# Patient Record
Sex: FEMALE | NOT HISPANIC OR LATINO | URBAN - METROPOLITAN AREA
[De-identification: names, ages, dates, MRNs, and addresses within clinical notes are randomized per-mention and may not be internally consistent; named-entity substitution may affect disease eponyms.]

---

## 2020-10-01 ENCOUNTER — APPOINTMENT (RX ONLY)
Dept: URBAN - METROPOLITAN AREA CLINIC 120 | Facility: CLINIC | Age: 40
Setting detail: DERMATOLOGY
End: 2020-10-01

## 2020-10-01 DIAGNOSIS — D18.0 HEMANGIOMA: ICD-10-CM

## 2020-10-01 DIAGNOSIS — L81.4 OTHER MELANIN HYPERPIGMENTATION: ICD-10-CM

## 2020-10-01 DIAGNOSIS — L82.1 OTHER SEBORRHEIC KERATOSIS: ICD-10-CM

## 2020-10-01 DIAGNOSIS — L71.8 OTHER ROSACEA: ICD-10-CM

## 2020-10-01 DIAGNOSIS — L98.8 OTHER SPECIFIED DISORDERS OF THE SKIN AND SUBCUTANEOUS TISSUE: ICD-10-CM

## 2020-10-01 DIAGNOSIS — D22 MELANOCYTIC NEVI: ICD-10-CM

## 2020-10-01 PROBLEM — D23.62 OTHER BENIGN NEOPLASM OF SKIN OF LEFT UPPER LIMB, INCLUDING SHOULDER: Status: ACTIVE | Noted: 2020-10-01

## 2020-10-01 PROBLEM — D18.01 HEMANGIOMA OF SKIN AND SUBCUTANEOUS TISSUE: Status: ACTIVE | Noted: 2020-10-01

## 2020-10-01 PROBLEM — D22.9 MELANOCYTIC NEVI, UNSPECIFIED: Status: ACTIVE | Noted: 2020-10-01

## 2020-10-01 PROCEDURE — ? COUNSELING

## 2020-10-01 PROCEDURE — ? FULL BODY SKIN EXAM

## 2020-10-01 PROCEDURE — ? MEDICAL CONSULTATION: DYNAMIC RHYTIDES

## 2020-10-01 PROCEDURE — 99203 OFFICE O/P NEW LOW 30 MIN: CPT

## 2020-10-01 PROCEDURE — ? OBSERVATION AND MEASURE

## 2020-10-01 PROCEDURE — ? ADDITIONAL NOTES

## 2020-10-01 ASSESSMENT — LOCATION ZONE DERM: LOCATION ZONE: FACE

## 2020-10-01 ASSESSMENT — LOCATION SIMPLE DESCRIPTION DERM
LOCATION SIMPLE: RIGHT CHEEK
LOCATION SIMPLE: LEFT FOREHEAD

## 2020-10-01 ASSESSMENT — LOCATION DETAILED DESCRIPTION DERM
LOCATION DETAILED: LEFT MEDIAL FOREHEAD
LOCATION DETAILED: RIGHT CENTRAL MALAR CHEEK

## 2020-10-01 NOTE — PROCEDURE: ADDITIONAL NOTES
Additional Notes: Pt quoted $350 per area or $700 for 2 areas \\nPt interested in treatment of glabella and forehead
Detail Level: Simple
Additional Notes: Stressed daily application of sunscreen\\Neel this time, rosacea is minimal and barely visible.  Advised patient to continue with sunscreen and return to the office if rosacea worsens.  Patient agreed with treatment plan.\\nRecommended Avene antirougeurs redness relief soothing cream and Avene antirougeurs soothing cleansing lotion
Additional Notes: If any changes develop--change in size, color, symptoms--patient to return to the office for a recheck and possible biopsy.  At this time, will monitor.  Patient agreed with treatment plan.
Additional Notes: Discussed with patient if interested in Botox for the forehead, may also consider Botox for the glabella area as these muscles may overcompensate which could cause an undesired end result.  Patient stated understanding to all and will consider for the future.